# Patient Record
Sex: FEMALE | Race: WHITE | Employment: FULL TIME | URBAN - METROPOLITAN AREA
[De-identification: names, ages, dates, MRNs, and addresses within clinical notes are randomized per-mention and may not be internally consistent; named-entity substitution may affect disease eponyms.]

---

## 2017-01-06 ENCOUNTER — TELEPHONE (OUTPATIENT)
Dept: NEUROLOGY | Age: 52
End: 2017-01-06

## 2017-01-06 NOTE — TELEPHONE ENCOUNTER
Notified the patient that we do have medication in the office.     Can you please note if everything is still approved for her appointment 1/11/2017

## 2017-01-06 NOTE — TELEPHONE ENCOUNTER
Please call re patient asks if her botox has been received? she says she used to get [facility] w/the botox so that she doesn't have the bad two day headache after the injection. She says she was unable to get it the last time, she wants to know if it can be used this time? Please advise.

## 2017-01-09 NOTE — TELEPHONE ENCOUNTER
This order was processed prior to transition to me. There are no patient notes in [place] [address] regarding the Botox.

## 2017-01-11 ENCOUNTER — OFFICE VISIT (OUTPATIENT)
Dept: NEUROLOGY | Age: 52
End: 2017-01-11

## 2017-01-11 ENCOUNTER — TELEPHONE (OUTPATIENT)
Dept: NEUROLOGY | Age: 52
End: 2017-01-11

## 2017-01-11 VITALS
HEART RATE: 70 BPM | SYSTOLIC BLOOD PRESSURE: 104 MMHG | WEIGHT: 134.6 LBS | BODY MASS INDEX: 21.63 KG/M2 | DIASTOLIC BLOOD PRESSURE: 68 MMHG | OXYGEN SATURATION: 94 % | HEIGHT: 66 IN

## 2017-01-11 DIAGNOSIS — G43.719 INTRACTABLE CHRONIC MIGRAINE WITHOUT AURA AND WITHOUT STATUS MIGRAINOSUS: ICD-10-CM

## 2017-01-11 DIAGNOSIS — G93.32 CFS (CHRONIC FATIGUE SYNDROME): Primary | ICD-10-CM

## 2017-01-11 RX ORDER — BUPIVACAINE HYDROCHLORIDE 5 MG/ML
10 INJECTION, SOLUTION PERINEURAL ONCE
Qty: 2 ML | Refills: 0
Start: 2017-01-11 | End: 2017-01-11
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
## 2017-01-11 NOTE — PROCEDURES
Patient comes for Chemo denervation for chronic migraine headaches. Since last seen there has been an at least 60% reduction in the severity of migraines over her previous baseline which exceeded more than 14 migraine days a month. There has been no weakness, fatigue or pain at the site of the injections or generally. After consenting the patient and discussing the procedure and possible side effects. The chemodenervant was reconstituted as follows:  500 units of dysport was mixed with 5ml of perservative free saline and withdrawn, using a sterile syringe into four 1ml BD syringes. Sterile 30 gauge half inch needles were affixed to the syringes. Procedure   Chemodenervant was injected into the following muscles which were identified using their anatomical land marks. The site was aspirated prior to injection to ensure that there was no vascular compromise. Muscle Units No. Of injections   Trapezius BL 15 each 3 each   Cervical Paraspinals BL 15 each 3 each   Occipitalis 10 each 3 each   Temporalis BL 15 each 3 each    BL 5 each 1 each   Procerus 5 1   Frontalis 50 5   Masseter BL 20 each 2 each   Total unitis 195        Nominal bleeding at injection sites. Patient tolerated the procedure well. No reported pain following the procedure.

## 2017-01-11 NOTE — MR AVS SNAPSHOT
Visit Information Date & Time Provider Department Dept. Phone Encounter #  
 1/11/2017  9:40 AM Vance Christiansen MD [facility] at [facility] [phone] [id number] Follow-up Instructions Return in about 3 months (around 4/11/2017). Upcoming Health Maintenance Date Due  
 BREAST CANCER SCRN MAMMOGRAM 2/25/2016 INFLUENZA AGE 9 TO ADULT 8/1/2016 FOBT Q 1 YEAR AGE 50-75 3/13/2017 Pneumococcal 19-64 Highest Risk (2 of 3 - PCV13) 7/15/2017 DTaP/Tdap/Td series (2 - Td) 1/1/2020 Allergies as of 1/11/2017  Review Complete On: 1/11/2017 By: Roque Dupree Severity Noted Reaction Type Reactions Banana  04/26/2016    Other (comments) Trigger Migraine Broccoli  03/09/2016    Diarrhea Corn  03/09/2016    Diarrhea Gluten  03/09/2016    Diarrhea Milk Containing Products  03/09/2016    Diarrhea Morphine  02/11/2015    Hives Pineapple  03/12/2016    Unknown (comments) Pt states unknown reaction Soy  03/09/2016    Diarrhea Toradol [Ketorolac Tromethamine]  02/11/2015    Hives Tramadol  02/11/2015    Hives Watermelon  03/12/2016    Unknown (comments) Pt states unknown reaction Current Immunizations  Reviewed on 12/30/2015 Name Date Tdap 1/1/2010 Not reviewed this visit You Were Diagnosed With   
  
 Codes Comments Intractable chronic migraine without aura and without status migrainosus    -  Primary ICD-10-CM: V65.579 ICD-9-CM: 346.71   
 CFS (chronic fatigue syndrome)     ICD-10-CM: R53.82 
ICD-9-CM: 780.71 Vitals BP Pulse Height(growth percentile) Weight(growth percentile) SpO2 BMI  
 104/68 70 5' 6\" (1.676 m) 134 lb 9.6 oz (61.1 kg) 94% 21.73 kg/m2 OB Status Smoking Status Hysterectomy Former Smoker BMI and BSA Data Body Mass Index Body Surface Area 21.73 kg/m 2 1.69 m 2 Preferred Pharmacy Pharmacy Name Phone [id number] [facility] [phone] Your Updated Medication List  
  
   
This list is accurate as of: 1/11/17 10:23 AM.  Always use your most recent med list.  
  
  
  
  
 ACETYL L-CARNITINE PO Take 500 mg by mouth two (2) times a day. Acetyl l-carnitine-Vitacost brand  
  
 ascorbic acid (vitamin C) 1,000 mg tablet Commonly known as:  VITAMIN C Take 1,000 mg by mouth daily. ASTELIN 137 mcg (0.1 %) nasal spray Generic drug:  azelastine 1 Spray as needed. Use in each nostril as directed  Indications: Indication per Pt:  Migrain HA  
  
 black cohosh 540 mg Cap Take 540 mg by mouth two (2) times a day. Black Cohosh Nature's Way brand BOTOX INJECTION  
by IntraMUSCular route. Every three months  
  
 butalbital-acetaminophen  mg tablet Commonly known as:  Tejas Patel Take 1 Tab by mouth every eight (8) hours. BYSTOLIC 5 mg tablet Generic drug:  nebivolol Take  by mouth daily. Carboxymethylcell-Hypromellose 0.25-0.3 % Dlgl Commonly known as:  GENTEAL GEL Administer 1 Drop to both eyes as needed (ocular dryness). Indications: DRY EYE  
  
 clonazePAM 1 mg tablet Commonly known as:  KlonoPIN  
  
 coenzyme q10-vitamin e 100-100 mg-unit Cap Take 1 Cap by mouth daily. cyanocobalamin 1,000 mcg tablet Take 1,000 mcg by mouth daily. DHEA PO Take 5 mg by mouth daily. l-methylfolate 15 mg tablet Commonly known as:  Leory Heimlich Take 15 mg by mouth daily. L. acidoph & paracasei- S therm- Bifido 8 billion cell Cap cap Commonly known as:  BEAR-Q/RISAQUAD Take 1 Cap by mouth daily. Indications: SUPPLEMENT  
  
 lamoTRIgine 100 mg tablet Commonly known as: LaMICtal  
Take 1 Tab by mouth three (3) times daily. LOTEMAX 0.5 % Drpg Generic drug:  loteprednol etabonate Apply  to eye as needed (allergies). Indications: ALLERGIC CONJUNCTIVITIS MAGNESIUM PO Take 500 mg by mouth three (3) times daily. Magnesium LifeExtension brand  
  
 methylPREDNISolone 4 mg Tab Commonly known as:  MEDROL Take 4 mg by mouth as needed (as needed for migrraine). MULTIVITAMIN PO Take  by mouth. Megafood women over 40 mg one daily NALTREXONE Take 6 mg by mouth daily. Omega-3-DHA-EPA-Fish Oil 1,000 mg (120 mg-180 mg) Cap Take  by mouth. * OTHER  
P5P 50 activated B6-Vitacost brand 50 mg daily * OTHER  
D3-Healthy origins 10,000 international Units daily * OTHER Gum Rincinol P.R.N.  
  
 OTHER(NON-FORMULARY) Take 5 mg by mouth nightly. pavan Deng  
  
 progesterone 100 mg capsule Commonly known as:  PROMETRIUM Take 100 mg by mouth daily. Indications: HORMONE SUPPLEMENT  
  
 promethazine 25 mg tablet Commonly known as:  PHENERGAN Take 25 mg by mouth as needed for Nausea. As needed for migraine  
  
 saliva substitution Mwsh mouthwash Commonly known as:  BIOTENE DRY MOUTH ORAL RINSE 15 mL by Mucous Membrane route as needed (oral rinse for dry mouth). Indications: DRY THROAT  
  
 sodium chloride 0.65 % nasal spray Commonly known as:  OCEAN  
2 Sprays by Both Nostrils route every two (2) hours as needed for Congestion. Indications: DRY NOSE TURMERIC (BULK) Take 1,740 mg by mouth. Turmeric-curcumin C3 complex- vitacost drand TYLENOL 325 mg tablet Generic drug:  acetaminophen Take  by mouth every four (4) hours as needed for Pain. VITAMIN B-2 PO Take  by mouth. 200 mg 2 times daily Xylitol-Antiseptic Combo No.5 Lozx 2-4 Lozenges by Mucous Membrane route as needed (dry mouth). Indications: ORAL HYPOESTHESIA  
  
 zaleplon 10 mg capsule Commonly known as:  SONATA Take 10 mg by mouth nightly. * Notice: This list has 3 medication(s) that are the same as other medications prescribed for you.  Read the directions carefully, and ask your doctor or other care provider to review them with you. Follow-up Instructions Return in about 3 months (around 4/11/2017). Introducing [facility]! Dear Sim Ortiz: 
Thank you for requesting a [employer] account. Our records indicate that you already have an active [employer] account. You can access your account anytime at [email or web address] Did you know that you can access your hospital and ER discharge instructions at any time in [employer]? You can also review all of your test results from your hospital stay or ER visit. Additional Information If you have questions, please visit the Frequently Asked Questions section of the [employer] website at [email or web address] Remember, [employer] is NOT to be used for urgent needs. For medical emergencies, dial 911. Now available from your iPhone and Android! Please provide this summary of care documentation to your next provider. Your primary care clinician is listed as [address] [place] [address]. If you have any questions after today's visit, please call [phone].

## 2017-01-11 NOTE — TELEPHONE ENCOUNTER
Gave patient information to call per [facility] for her Botox in March 2017. [address] [facility] at [phone].

## 2017-03-22 DIAGNOSIS — G43.719 INTRACTABLE CHRONIC MIGRAINE WITHOUT AURA AND WITHOUT STATUS MIGRAINOSUS: ICD-10-CM

## 2017-03-22 NOTE — TELEPHONE ENCOUNTER
Called to verify the medication the patient is concerned about    Future Appointments  Date Time Provider Leeann Anderson   4/11/2017 10:40 AM Clovis Stephenson MD [address]                       Requested Prescriptions     Pending Prescriptions Disp Refills    butalbital-acetaminophen (PHRENILIN)  mg tablet 90 Tab 2     Sig: Take 1 Tab by mouth every eight (8) hours.      Please send in refill

## 2017-03-22 NOTE — TELEPHONE ENCOUNTER
----- Message from Rigoberto Mike sent at 3/22/2017  2:30 PM EDT -----  Regarding: Dr Meg Hernandez: [phone]  Pt is calling due to pharmacy [facility] [address] [facility]  hasn't heard anything from the office yet about refill and Pt is now out of medication and would like a call back .

## 2017-03-23 NOTE — TELEPHONE ENCOUNTER
Patient calling back re can her medication please be filled today? She is in pain and really needs the medicine today if at all possible, she says.

## 2017-03-24 NOTE — TELEPHONE ENCOUNTER
[facility] pharmacy called pt is out of the butalbital she is requesting her refills pharmacy will also be faxing the request

## 2017-03-25 RX ORDER — BUTALBITAL AND ACETAMINOPHEN 325; 50 MG/1; MG/1
1 TABLET ORAL EVERY 8 HOURS
Qty: 90 TAB | Refills: 2 | Status: SHIPPED | OUTPATIENT
Start: 2017-03-25 | End: 2017-06-30 | Stop reason: SDUPTHER

## 2017-03-28 ENCOUNTER — DOCUMENTATION ONLY (OUTPATIENT)
Dept: NEUROLOGY | Age: 52
End: 2017-03-28

## 2017-03-28 NOTE — PROGRESS NOTES
Received botox from [facility]   Lot # [id number]  Exp.  October 2019

## 2017-04-11 ENCOUNTER — OFFICE VISIT (OUTPATIENT)
Dept: NEUROLOGY | Age: 52
End: 2017-04-11

## 2017-04-11 VITALS
HEART RATE: 74 BPM | SYSTOLIC BLOOD PRESSURE: 110 MMHG | BODY MASS INDEX: 21.63 KG/M2 | WEIGHT: 134 LBS | OXYGEN SATURATION: 98 % | DIASTOLIC BLOOD PRESSURE: 62 MMHG

## 2017-04-11 DIAGNOSIS — G43.719 INTRACTABLE CHRONIC MIGRAINE WITHOUT AURA AND WITHOUT STATUS MIGRAINOSUS: Primary | ICD-10-CM

## 2017-04-11 NOTE — MR AVS SNAPSHOT
Visit Information Date & Time Provider Department Dept. Phone Encounter #  
 4/11/2017 10:40 AM Dawn Zepeda MD [facility] [phone] [id number] Follow-up Instructions Return in about 3 months (around 7/11/2017). Upcoming Health Maintenance Date Due  
 BREAST CANCER SCRN MAMMOGRAM 2/25/2016 INFLUENZA AGE 9 TO ADULT 8/1/2016 FOBT Q 1 YEAR AGE 50-75 3/13/2017 DTaP/Tdap/Td series (2 - Td) 1/1/2020 Allergies as of 4/11/2017  Review Complete On: 4/11/2017 By: Indiana Briggs Severity Noted Reaction Type Reactions Banana  04/26/2016    Other (comments) Trigger Migraine Broccoli  03/09/2016    Diarrhea Corn  03/09/2016    Diarrhea Gluten  03/09/2016    Diarrhea Milk Containing Products  03/09/2016    Diarrhea Morphine  02/11/2015    Hives Pineapple  03/12/2016    Unknown (comments) Pt states unknown reaction Soy  03/09/2016    Diarrhea Toradol [Ketorolac Tromethamine]  02/11/2015    Hives Tramadol  02/11/2015    Hives Watermelon  03/12/2016    Unknown (comments) Pt states unknown reaction Current Immunizations  Reviewed on 12/30/2015 Name Date Tdap 1/1/2010 Not reviewed this visit You Were Diagnosed With   
  
 Codes Comments Intractable chronic migraine without aura and without status migrainosus    -  Primary ICD-10-CM: J46.539 ICD-9-CM: 346.71 Vitals BP Pulse Weight(growth percentile) SpO2 BMI OB Status 110/62 74 134 lb (60.8 kg) 98% 21.63 kg/m2 Hysterectomy Smoking Status Former Smoker BMI and BSA Data Body Mass Index Body Surface Area  
 21.63 kg/m 2 1.68 m 2 Preferred Pharmacy Pharmacy Name Phone [phone] [phone] Your Updated Medication List  
  
   
 This list is accurate as of: 4/11/17 12:03 PM.  Always use your most recent med list.  
  
  
  
  
 ACETYL L-CARNITINE PO Take 500 mg by mouth two (2) times a day. Acetyl l-carnitine-Vitacost brand  
  
 ascorbic acid (vitamin C) 1,000 mg tablet Commonly known as:  VITAMIN C Take 1,000 mg by mouth daily. ASTELIN 137 mcg (0.1 %) nasal spray Generic drug:  azelastine 1 Spray as needed. Use in each nostril as directed  Indications: Indication per Pt:  Migrain HA  
  
 black cohosh 540 mg Cap Take 540 mg by mouth two (2) times a day. Black Cohosh [address] brand BOTOX INJECTION  
by IntraMUSCular route. Every three months  
  
 butalbital-acetaminophen  mg tablet Commonly known as:  Jose Mantis Take 1 Tab by mouth every eight (8) hours. BYSTOLIC 5 mg tablet Generic drug:  nebivolol Take  by mouth daily. Carboxymethylcell-Hypromellose 0.25-0.3 % Dlgl Commonly known as:  GENTEAL GEL Administer 1 Drop to both eyes as needed (ocular dryness). Indications: DRY EYE  
  
 clonazePAM 1 mg tablet Commonly known as:  KlonoPIN  
  
 coenzyme q10-vitamin e 100-100 mg-unit Cap Take 1 Cap by mouth daily. cyanocobalamin 1,000 mcg tablet Take 1,000 mcg by mouth daily. DHEA PO Take 5 mg by mouth daily. l-methylfolate 15 mg tablet Commonly known as:  Mammie Heckle Take 15 mg by mouth daily. L. acidoph & paracasei- S therm- Bifido 8 billion cell Cap cap Commonly known as:  BEAR-Q/RISAQUAD Take 1 Cap by mouth daily. Indications: SUPPLEMENT  
  
 lamoTRIgine 100 mg tablet Commonly known as: LaMICtal  
Take 1 Tab by mouth three (3) times daily. LOTEMAX 0.5 % Drpg Generic drug:  loteprednol etabonate Apply  to eye as needed (allergies). Indications: ALLERGIC CONJUNCTIVITIS  
  
 MAGNESIUM PO Take 500 mg by mouth three (3) times daily. Magnesium [employer] brand  
  
 methylPREDNISolone 4 mg Tab Commonly known as:  MEDROL Take 4 mg by mouth as needed (as needed for migrraine). MULTIVITAMIN PO Take  by mouth. Megafood women over 40 mg one daily NALTREXONE Take 6 mg by mouth daily. Omega-3-DHA-EPA-Fish Oil 1,000 mg (120 mg-180 mg) Cap Take  by mouth. * OTHER  
P5P 50 activated B6-Vitacost brand 50 mg daily * OTHER  
D3-Healthy origins 10,000 international Units daily * OTHER Gum Rincinol P.R.N.  
  
 OTHER(NON-FORMULARY) Take 5 mg by mouth nightly. pavan Christy  
  
 progesterone 100 mg capsule Commonly known as:  PROMETRIUM Take 100 mg by mouth daily. Indications: HORMONE SUPPLEMENT  
  
 promethazine 25 mg tablet Commonly known as:  PHENERGAN Take 25 mg by mouth as needed for Nausea. As needed for migraine  
  
 saliva substitution Mwsh mouthwash Commonly known as:  BIOTENE DRY MOUTH ORAL RINSE 15 mL by Mucous Membrane route as needed (oral rinse for dry mouth). Indications: DRY THROAT  
  
 sodium chloride 0.65 % nasal spray Commonly known as:  OCEAN  
2 Sprays by Both Nostrils route every two (2) hours as needed for Congestion. Indications: DRY NOSE TURMERIC (BULK) Take 1,740 mg by mouth. Turmeric-curcumin C3 complex- vitacost alexandre TYLENOL 325 mg tablet Generic drug:  acetaminophen Take  by mouth every four (4) hours as needed for Pain. VITAMIN B-2 PO Take  by mouth. 200 mg 2 times daily Xylitol-Antiseptic Combo No.5 Lozx 2-4 Lozenges by Mucous Membrane route as needed (dry mouth). Indications: ORAL HYPOESTHESIA  
  
 zaleplon 10 mg capsule Commonly known as:  SONATA Take 10 mg by mouth nightly. * Notice: This list has 3 medication(s) that are the same as other medications prescribed for you. Read the directions carefully, and ask your doctor or other care provider to review them with you. Follow-up Instructions Return in about 3 months (around 7/11/2017). Patient Instructions OnabotulinumtoxinA (By injection) OnabotulinumtoxinA (bz-j-moo-bf-UTL-tdd-tox-in-ay) Treats muscle stiffness, muscle spasms, excessive sweating, overactive bladder, or loss of bladder control. Prevents chronic migraine headaches. Improves the appearance of wrinkles on the face. Brand Name(s):Botox, Botox Cosmetic There may be other brand names for this medicine. When This Medicine Should Not Be Used: This medicine is not right for everyone. You should not receive this medicine if you had an allergic reaction to onabotulinumtoxinA or any other botulinum toxin product. How to Use This Medicine:  
Injectable · Your doctor will prescribe your exact dose and tell you how often it should be given. This medicine is given by a healthcare provider as a shot under your skin or into a muscle. · You may be given medicine to numb the area where the shot will be injected. If you receive the medicine around your eyes, you may be given eye drops or ointment to numb the area. After your injection, you may need to wear a protective contact lens or eye patch. · If you are being treated for excessive sweating, shave your underarms but do not use deodorant for 24 hours before your injection. Avoid exercise, hot foods or liquids, or anything else that could make you sweat for 30 minutes before your injection. · The recommended treatment schedule for chronic migraine is every 12 weeks. · This medicine works slowly. Once your condition has improved, the medicine will last about 3 months, then the effects will slowly go away. You might need more injections to treat your condition. ¨ Muscle spasms in the eyelids should improve within 3 to 10 days. ¨ Eye muscle problems should improve 1 or 2 days after the injection, and the improvement should last for 2 to 6 weeks. ¨ Neck pain should improve within 2 to 6 weeks. ¨ Arm stiffness should improve within 4 to 6 weeks. ¨ Facial lines or wrinkles should improve 1 or 2 days. · This medicine should come with a Medication Guide. Ask your pharmacist for a copy if you do not have one. · Missed dose:Call your doctor or pharmacist for instructions. Drugs and Foods to Avoid: Ask your doctor or pharmacist before using any other medicine, including over-the-counter medicines, vitamins, and herbal products. · Some foods and medicine can affect how onabotulinumtoxinA works. Tell your doctor if you are using any of the following: ¨ Aspirin or a blood thinner (such as ticlopidine, warfarin) ¨ Muscle relaxer ¨ Medicine for an infection (such as amikacin, gentamicin, streptomycin, tobramycin) · Tell your doctor if you have received an injection of any botulinum toxin product within the past 4 months. Warnings While Using This Medicine: · Tell your doctor if you are pregnant or breastfeeding, or if you have breathing or lung problems, bleeding problems, heart or blood vessel disease, or nerve or muscle problems (such as myasthenia gravis). Tell your doctor if you have ever had face surgery or if you have a urinary tract infection or trouble urinating, diabetes, or multiple sclerosis. · This medicine may cause the following problems: ¨ Muscle weakness, loss of bladder control, trouble swallowing, speaking, or breathing (caused by the toxin spreading to other parts of your body) · This medicine may make your muscles weak or cause vision problems. Do not drive or do anything else that could be dangerous until you know how this medicine affects you. · There are some warnings that only apply if you are receiving this medicine to treat the following: ¨ Injections near the eye: This medicine may reduce blinking, which can raise the risk of eye problems such as corneal exposure and ulcers. Tell your doctor right away if you notice that you are blinking less than usual or your eyes feel dry. ¨ Urinary incontinence:  This medicine may cause autonomic dysreflexia, which can be a life-threatening condition. ¨ Overactive bladder: Check with your doctor right away if you have trouble urinating or a burning sensation while urinating. · This medicine contains products from donated human blood, so it may contain viruses, although the risk is low. Human donors and blood are always tested for viruses to keep the risk low. Talk with your doctor about this risk if you are concerned. · Your doctor will check your progress and the effects of this medicine at regular visits. Keep all appointments. Possible Side Effects While Using This Medicine:  
Call your doctor right away if you notice any of these side effects: · Allergic reaction: Itching or hives, swelling in your face or hands, swelling or tingling in your mouth or throat, chest tightness, trouble breathing · Blurred or double vision, droopy eyelids · Change in how much or how often you urinate, trouble urinating, or painful urination · Chest pain, slow or uneven heartbeat · Headache, increased sweating, warmth or redness in your face, neck, or arm · Muscle weakness · Trouble swallowing, talking, or breathing If you notice these less serious side effects, talk with your doctor: · Fever, chills, cough, stuffy or runny nose, sore throat, and body aches · Pain in your neck, back, arms, or legs · Redness, pain, tenderness, bruising, swelling, or weakness where the shot was given If you notice other side effects that you think are caused by this medicine, tell your doctor. Call your doctor for medical advice about side effects. You may report side effects to FDA at 4-091-SZD-1062 © 2016 [address] is for End User's use only and may not be sold, redistributed or otherwise used for commercial purposes. The above information is an  only. It is not intended as medical advice for individual conditions or treatments.  Talk to your doctor, nurse or pharmacist before following any medical regimen to see if it is safe and effective for you. Introducing [facility]! Dear Jose Angel Nolan: 
Thank you for requesting a [employer] account. Our records indicate that you already have an active [employer] account. You can access your account anytime at [email or web address] Did you know that you can access your hospital and ER discharge instructions at any time in [employer]? You can also review all of your test results from your hospital stay or ER visit. Additional Information If you have questions, please visit the Frequently Asked Questions section of the [employer] website at [email or web address] Remember, [employer] is NOT to be used for urgent needs. For medical emergencies, dial 911. Now available from your iPhone and Android! Please provide this summary of care documentation to your next provider. Your primary care clinician is listed as Maricruz Ko. If you have any questions after today's visit, please call [phone].

## 2017-04-11 NOTE — PATIENT INSTRUCTIONS
OnabotulinumtoxinA (By injection)   OnabotulinumtoxinA (cg-a-mdv-ui-GYP-rvs-tox-in-ay)  Treats muscle stiffness, muscle spasms, excessive sweating, overactive bladder, or loss of bladder control. Prevents chronic migraine headaches. Improves the appearance of wrinkles on the face. Brand Name(s):Botox, Botox Cosmetic   There may be other brand names for this medicine. When This Medicine Should Not Be Used: This medicine is not right for everyone. You should not receive this medicine if you had an allergic reaction to onabotulinumtoxinA or any other botulinum toxin product. How to Use This Medicine:   Injectable  · Your doctor will prescribe your exact dose and tell you how often it should be given. This medicine is given by a healthcare provider as a shot under your skin or into a muscle. · You may be given medicine to numb the area where the shot will be injected. If you receive the medicine around your eyes, you may be given eye drops or ointment to numb the area. After your injection, you may need to wear a protective contact lens or eye patch. · If you are being treated for excessive sweating, shave your underarms but do not use deodorant for 24 hours before your injection. Avoid exercise, hot foods or liquids, or anything else that could make you sweat for 30 minutes before your injection. · The recommended treatment schedule for chronic migraine is every 12 weeks. · This medicine works slowly. Once your condition has improved, the medicine will last about 3 months, then the effects will slowly go away. You might need more injections to treat your condition. ¨ Muscle spasms in the eyelids should improve within 3 to 10 days. ¨ Eye muscle problems should improve 1 or 2 days after the injection, and the improvement should last for 2 to 6 weeks. ¨ Neck pain should improve within 2 to 6 weeks. ¨ Arm stiffness should improve within 4 to 6 weeks.   ¨ Facial lines or wrinkles should improve 1 or 2 days.  · This medicine should come with a Medication Guide. Ask your pharmacist for a copy if you do not have one. · Missed dose:Call your doctor or pharmacist for instructions. Drugs and Foods to Avoid:   Ask your doctor or pharmacist before using any other medicine, including over-the-counter medicines, vitamins, and herbal products. · Some foods and medicine can affect how onabotulinumtoxinA works. Tell your doctor if you are using any of the following:  ¨ Aspirin or a blood thinner (such as ticlopidine, warfarin)  ¨ Muscle relaxer  ¨ Medicine for an infection (such as amikacin, gentamicin, streptomycin, tobramycin)  · Tell your doctor if you have received an injection of any botulinum toxin product within the past 4 months. Warnings While Using This Medicine:   · Tell your doctor if you are pregnant or breastfeeding, or if you have breathing or lung problems, bleeding problems, heart or blood vessel disease, or nerve or muscle problems (such as myasthenia gravis). Tell your doctor if you have ever had face surgery or if you have a urinary tract infection or trouble urinating, diabetes, or multiple sclerosis. · This medicine may cause the following problems:  ¨ Muscle weakness, loss of bladder control, trouble swallowing, speaking, or breathing (caused by the toxin spreading to other parts of your body)  · This medicine may make your muscles weak or cause vision problems. Do not drive or do anything else that could be dangerous until you know how this medicine affects you. · There are some warnings that only apply if you are receiving this medicine to treat the following:   ¨ Injections near the eye: This medicine may reduce blinking, which can raise the risk of eye problems such as corneal exposure and ulcers. Tell your doctor right away if you notice that you are blinking less than usual or your eyes feel dry. ¨ Urinary incontinence:  This medicine may cause autonomic dysreflexia, which can be a life-threatening condition. ¨ Overactive bladder: Check with your doctor right away if you have trouble urinating or a burning sensation while urinating. · This medicine contains products from donated human blood, so it may contain viruses, although the risk is low. Human donors and blood are always tested for viruses to keep the risk low. Talk with your doctor about this risk if you are concerned. · Your doctor will check your progress and the effects of this medicine at regular visits. Keep all appointments. Possible Side Effects While Using This Medicine:   Call your doctor right away if you notice any of these side effects:  · Allergic reaction: Itching or hives, swelling in your face or hands, swelling or tingling in your mouth or throat, chest tightness, trouble breathing  · Blurred or double vision, droopy eyelids  · Change in how much or how often you urinate, trouble urinating, or painful urination  · Chest pain, slow or uneven heartbeat  · Headache, increased sweating, warmth or redness in your face, neck, or arm  · Muscle weakness  · Trouble swallowing, talking, or breathing  If you notice these less serious side effects, talk with your doctor:   · Fever, chills, cough, stuffy or runny nose, sore throat, and body aches  · Pain in your neck, back, arms, or legs  · Redness, pain, tenderness, bruising, swelling, or weakness where the shot was given  If you notice other side effects that you think are caused by this medicine, tell your doctor. Call your doctor for medical advice about side effects. You may report side effects to FDA at 5-195-FDA-9605  © 2016 [address] is for End User's use only and may not be sold, redistributed or otherwise used for commercial purposes. The above information is an  only. It is not intended as medical advice for individual conditions or treatments.  Talk to your doctor, nurse or pharmacist before following any medical regimen to see if it is safe and effective for you.

## 2017-04-11 NOTE — PROCEDURES
Patient comes for Chemodenervation for chronic migraine headaches. Since last seen there has been an at least 60% reduction in the severity of migraines over her previous baseline which exceeded more than 15 migraine days a month. There has been no weakness, fatigue or pain at the site of the injections or generally. Prior to chemodenervation migraines were refractory to treatment with abortive medications including Imitrex, NSAIDs and barbiturates. Similiarly they were refractory to antidepressants, antiepileptics and betablockers which were used as preventive therapy. Each treatment was tried at least two months or stopped because of an adverse reaction. After consenting the patient and discussing the procedure and possible side effects. The chemodenervant was reconstituted as follows:  200 units of botox was mixed with 4ml of perservative free saline and withdrawn, using a into four sterile 1ml BD syringes. Sterile 30 gauge half inch needles were affixed to the syringes. Procedure   Chemodenervant was injected into the following muscles which were identified using their anatomical land marks. Each site was aspirated prior to injection to ensure that there was no vascular compromise. Muscle Units/inj No. Of injections   Trapezius BL 5 U 3 each   Cervical Paraspinals BL 5 U 3 each   Occipitalis 5 U 3 each   Temporalis BL 5 U 3 each    BL 5 U 1 each   Procerus 5 U 1   Frontalis BL 5 U 5   Masseter BL 5 U  2 each   Total units 180      Nominal bleeding at injection sites. Patient tolerated the procedure well. No reported pain following the procedure. Kimi Shepherd

## 2017-06-23 ENCOUNTER — TELEPHONE (OUTPATIENT)
Dept: NEUROLOGY | Age: 52
End: 2017-06-23

## 2017-06-23 ENCOUNTER — DOCUMENTATION ONLY (OUTPATIENT)
Dept: NEUROLOGY | Age: 52
End: 2017-06-23

## 2017-06-23 DIAGNOSIS — G43.711 CHRONIC MIGRAINE WITHOUT AURA, INTRACTABLE, WITH STATUS MIGRAINOSUS: Primary | ICD-10-CM

## 2017-06-23 NOTE — PROGRESS NOTES
Dr. Rochelle Mcclelland we need a current botox referral for this patient's 07/13th botox appointment     Thank you

## 2017-06-23 NOTE — TELEPHONE ENCOUNTER
Faxed manually PA request for Botox to [facility] for both [id number] and [id number]. They authorized both at the same time, and they use their own [address] - once approved. Last procedure note attached from 4/11/17 - demonstrating 60% reduction of headaches since prior to starting Botox treatments.

## 2017-06-30 DIAGNOSIS — G43.719 INTRACTABLE CHRONIC MIGRAINE WITHOUT AURA AND WITHOUT STATUS MIGRAINOSUS: ICD-10-CM

## 2017-07-07 RX ORDER — BUTALBITAL AND ACETAMINOPHEN 325; 50 MG/1; MG/1
1 TABLET ORAL EVERY 8 HOURS
Qty: 90 TAB | Refills: 2 | Status: SHIPPED | OUTPATIENT
Start: 2017-07-07 | End: 2017-09-26 | Stop reason: SDUPTHER

## 2017-07-07 NOTE — TELEPHONE ENCOUNTER
Dr. Mcnair Doing, received request for refill for butalbital-acetaminophen fom patient do you want to refill?

## 2017-07-10 DIAGNOSIS — G43.719 INTRACTABLE CHRONIC MIGRAINE WITHOUT AURA AND WITHOUT STATUS MIGRAINOSUS: Primary | ICD-10-CM

## 2017-07-10 RX ORDER — METHYLPREDNISOLONE 4 MG/1
TABLET ORAL
Qty: 1 DOSE PACK | Refills: 0 | Status: SHIPPED | OUTPATIENT
Start: 2017-07-10 | End: 2017-09-26 | Stop reason: SDUPTHER

## 2017-07-12 ENCOUNTER — DOCUMENTATION ONLY (OUTPATIENT)
Dept: NEUROLOGY | Age: 52
End: 2017-07-12

## 2017-07-13 ENCOUNTER — OFFICE VISIT (OUTPATIENT)
Dept: NEUROLOGY | Age: 52
End: 2017-07-13

## 2017-07-13 VITALS — DIASTOLIC BLOOD PRESSURE: 60 MMHG | OXYGEN SATURATION: 98 % | SYSTOLIC BLOOD PRESSURE: 100 MMHG | HEART RATE: 66 BPM

## 2017-07-13 DIAGNOSIS — G93.32 CFS (CHRONIC FATIGUE SYNDROME): ICD-10-CM

## 2017-07-13 DIAGNOSIS — G43.719 INTRACTABLE CHRONIC MIGRAINE WITHOUT AURA AND WITHOUT STATUS MIGRAINOSUS: Primary | ICD-10-CM

## 2017-07-13 DIAGNOSIS — M35.01 SJOGREN'S SYNDROME WITH KERATOCONJUNCTIVITIS SICCA (HCC): ICD-10-CM

## 2017-07-13 NOTE — MR AVS SNAPSHOT
Visit Information Date & Time Provider Department Dept. Phone Encounter #  
 7/13/2017 12:00 PM Fany Ascencio MD [facility] at [facility] [phone] [id number] Follow-up Instructions Return in about 3 months (around 10/13/2017) for botox. Upcoming Health Maintenance Date Due  
 BREAST CANCER SCRN MAMMOGRAM 2/25/2016 FOBT Q 1 YEAR AGE 50-75 3/13/2017 INFLUENZA AGE 9 TO ADULT 8/1/2017 DTaP/Tdap/Td series (2 - Td) 1/1/2020 Allergies as of 7/13/2017  Review Complete On: 7/13/2017 By: Fany Ascencio MD  
  
 Severity Noted Reaction Type Reactions Banana  04/26/2016    Other (comments) Trigger Migraine Broccoli  03/09/2016    Diarrhea Corn  03/09/2016    Diarrhea Gluten  03/09/2016    Diarrhea Milk Containing Products  03/09/2016    Diarrhea Morphine  02/11/2015    Hives Pineapple  03/12/2016    Unknown (comments) Pt states unknown reaction Soy  03/09/2016    Diarrhea Toradol [Ketorolac Tromethamine]  02/11/2015    Hives Tramadol  02/11/2015    Hives Watermelon  03/12/2016    Unknown (comments) Pt states unknown reaction Current Immunizations  Reviewed on 12/30/2015 Name Date Tdap 1/1/2010 Not reviewed this visit You Were Diagnosed With   
  
 Codes Comments Intractable chronic migraine without aura and without status migrainosus    -  Primary ICD-10-CM: K38.275 ICD-9-CM: 346.71 Sjogren's syndrome with keratoconjunctivitis sicca (HCC)     ICD-10-CM: M35.01 
ICD-9-CM: 710.2 CFS (chronic fatigue syndrome)     ICD-10-CM: R53.82 
ICD-9-CM: 780.71 Vitals BP Pulse SpO2 OB Status Smoking Status 100/60 66 98% Hysterectomy Former Smoker Preferred Pharmacy Pharmacy Name Phone [phone] [facility] [phone] Your Updated Medication List  
  
   
 This list is accurate as of: 7/13/17 12:42 PM.  Always use your most recent med list.  
  
  
  
  
 ACETYL L-CARNITINE PO Take 500 mg by mouth two (2) times a day. Acetyl l-carnitine-Vitacost brand  
  
 ascorbic acid (vitamin C) 1,000 mg tablet Commonly known as:  VITAMIN C Take 1,000 mg by mouth daily. ASTELIN 137 mcg (0.1 %) nasal spray Generic drug:  azelastine 1 Spray as needed. Use in each nostril as directed  Indications: Indication per Pt:  Migrain HA  
  
 black cohosh 540 mg Cap Take 540 mg by mouth two (2) times a day. Black Cohosh [address] brand  
  
 butalbital-acetaminophen  mg tablet Commonly known as:  Eugenio Peaches Take 1 Tab by mouth every eight (8) hours. BYSTOLIC 5 mg tablet Generic drug:  nebivolol Take  by mouth daily. Carboxymethylcell-Hypromellose 0.25-0.3 % Dlgl Commonly known as:  GENTEAL GEL Administer 1 Drop to both eyes as needed (ocular dryness). Indications: DRY EYE  
  
 clonazePAM 1 mg tablet Commonly known as:  KlonoPIN  
  
 coenzyme q10-vitamin e 100-100 mg-unit Cap Take 1 Cap by mouth daily. cyanocobalamin 1,000 mcg tablet Take 1,000 mcg by mouth daily. DHEA PO Take 5 mg by mouth daily. l-methylfolate 15 mg tablet Commonly known as:  Iglesia Song Take 15 mg by mouth daily. L. acidoph & paracasei- S therm- Bifido 8 billion cell Cap cap Commonly known as:  BEAR-Q/RISAQUAD Take 1 Cap by mouth daily. Indications: SUPPLEMENT  
  
 lamoTRIgine 100 mg tablet Commonly known as: LaMICtal  
Take 1 Tab by mouth three (3) times daily. LOTEMAX 0.5 % Drpg Generic drug:  loteprednol etabonate Apply  to eye as needed (allergies). Indications: ALLERGIC CONJUNCTIVITIS  
  
 MAGNESIUM PO Take 500 mg by mouth three (3) times daily. Magnesium [employer] brand * methylPREDNISolone 4 mg Tab Commonly known as:  MEDROL Take 4 mg by mouth as needed (as needed for migrraine). * methylPREDNISolone 4 mg tablet Commonly known as:  Pollie Parviz Take as directed MULTIVITAMIN PO Take  by mouth. Megafood women over 40 mg one daily NALTREXONE Take 6 mg by mouth daily. Omega-3-DHA-EPA-Fish Oil 1,000 mg (120 mg-180 mg) Cap Take  by mouth. onabotulinumtoxinA 200 unit injection Commonly known as:  BOTOX  
200 units by IM route once every 12 weeks indication migraine prevention Dx G43.719. Inject IM neck/face,31 FDA approved sites * OTHER  
P5P 50 activated B6-Vitacost brand 50 mg daily * OTHER  
D3-Healthy origins 10,000 international Units daily * OTHER Gum Rincinol P.R.N.  
  
 OTHER(NON-FORMULARY) Take 5 mg by mouth nightly. pavan Diaz  
  
 progesterone 100 mg capsule Commonly known as:  PROMETRIUM Take 100 mg by mouth daily. Indications: HORMONE SUPPLEMENT  
  
 promethazine 25 mg tablet Commonly known as:  PHENERGAN Take 25 mg by mouth as needed for Nausea. As needed for migraine  
  
 saliva substitution Mwsh mouthwash Commonly known as:  BIOTENE DRY MOUTH ORAL RINSE 15 mL by Mucous Membrane route as needed (oral rinse for dry mouth). Indications: DRY THROAT  
  
 sodium chloride 0.65 % nasal spray Commonly known as:  OCEAN  
2 Sprays by Both Nostrils route every two (2) hours as needed for Congestion. Indications: DRY NOSE TURMERIC (BULK) Take 1,740 mg by mouth. Turmeric-curcumin C3 complex- vitacost alexandre TYLENOL 325 mg tablet Generic drug:  acetaminophen Take  by mouth every four (4) hours as needed for Pain. VITAMIN B-2 PO Take  by mouth. 200 mg 2 times daily Xylitol-Antiseptic Combo No.5 Lozx 2-4 Lozenges by Mucous Membrane route as needed (dry mouth). Indications: ORAL HYPOESTHESIA  
  
 zaleplon 10 mg capsule Commonly known as:  SONATA Take 10 mg by mouth nightly. * Notice:   This list has 5 medication(s) that are the same as other medications prescribed for you. Read the directions carefully, and ask your doctor or other care provider to review them with you. Follow-up Instructions Return in about 3 months (around 10/13/2017) for botox. Patient Instructions OnabotulinumtoxinA (By injection) OnabotulinumtoxinA (fs-n-yhz-ls-LZL-qut-tox-in-ay) Treats muscle stiffness, muscle spasms, excessive sweating, overactive bladder, or loss of bladder control. Prevents chronic migraine headaches. Improves the appearance of wrinkles on the face. Brand Name(s): Botox, Botox Cosmetic There may be other brand names for this medicine. When This Medicine Should Not Be Used: This medicine is not right for everyone. You should not receive this medicine if you had an allergic reaction to onabotulinumtoxinA or any other botulinum toxin product. How to Use This Medicine:  
Injectable · Your doctor will prescribe your exact dose and tell you how often it should be given. This medicine is given by a healthcare provider as a shot under your skin or into a muscle. · You may be given medicine to numb the area where the shot will be injected. If you receive the medicine around your eyes, you may be given eye drops or ointment to numb the area. After your injection, you may need to wear a protective contact lens or eye patch. · If you are being treated for excessive sweating, shave your underarms but do not use deodorant for 24 hours before your injection. Avoid exercise, hot foods or liquids, or anything else that could make you sweat for 30 minutes before your injection. · The recommended treatment schedule for chronic migraine is every 12 weeks. · This medicine works slowly. Once your condition has improved, the medicine will last about 3 months, then the effects will slowly go away. You might need more injections to treat your condition. ¨ Muscle spasms in the eyelids should improve within 3 to 10 days. ¨ Eye muscle problems should improve 1 or 2 days after the injection, and the improvement should last for 2 to 6 weeks. ¨ Neck pain should improve within 2 to 6 weeks. ¨ Arm stiffness should improve within 4 to 6 weeks. ¨ Facial lines or wrinkles should improve 1 or 2 days. · This medicine should come with a Medication Guide. Ask your pharmacist for a copy if you do not have one. · Missed dose:Call your doctor or pharmacist for instructions. Drugs and Foods to Avoid: Ask your doctor or pharmacist before using any other medicine, including over-the-counter medicines, vitamins, and herbal products. · Some foods and medicine can affect how onabotulinumtoxinA works. Tell your doctor if you are using any of the following: ¨ Aspirin or a blood thinner (such as ticlopidine, warfarin) ¨ Muscle relaxer ¨ Medicine for an infection (such as amikacin, gentamicin, streptomycin, tobramycin) · Tell your doctor if you have received an injection of any botulinum toxin product within the past 4 months. Warnings While Using This Medicine: · Tell your doctor if you are pregnant or breastfeeding, or if you have breathing or lung problems, bleeding problems, heart or blood vessel disease, or nerve or muscle problems (such as myasthenia gravis). Tell your doctor if you have ever had face surgery or if you have a urinary tract infection or trouble urinating, diabetes, or multiple sclerosis. · This medicine may cause the following problems: ¨ Muscle weakness, loss of bladder control, trouble swallowing, speaking, or breathing (caused by the toxin spreading to other parts of your body) · This medicine may make your muscles weak or cause vision problems. Do not drive or do anything else that could be dangerous until you know how this medicine affects you. · There are some warnings that only apply if you are receiving this medicine to treat the following: ¨ Injections near the eye: This medicine may reduce blinking, which can raise the risk of eye problems such as corneal exposure and ulcers. Tell your doctor right away if you notice that you are blinking less than usual or your eyes feel dry. ¨ Urinary incontinence: This medicine may cause autonomic dysreflexia, which can be a life-threatening condition. ¨ Overactive bladder: Check with your doctor right away if you have trouble urinating or a burning sensation while urinating. · This medicine contains products from donated human blood, so it may contain viruses, although the risk is low. Human donors and blood are always tested for viruses to keep the risk low. Talk with your doctor about this risk if you are concerned. · Your doctor will check your progress and the effects of this medicine at regular visits. Keep all appointments. Possible Side Effects While Using This Medicine:  
Call your doctor right away if you notice any of these side effects: · Allergic reaction: Itching or hives, swelling in your face or hands, swelling or tingling in your mouth or throat, chest tightness, trouble breathing · Blurred or double vision, droopy eyelids · Change in how much or how often you urinate, trouble urinating, or painful urination · Chest pain, slow or uneven heartbeat · Headache, increased sweating, warmth or redness in your face, neck, or arm · Muscle weakness · Trouble swallowing, talking, or breathing If you notice these less serious side effects, talk with your doctor: · Fever, chills, cough, stuffy or runny nose, sore throat, and body aches · Pain in your neck, back, arms, or legs · Redness, pain, tenderness, bruising, swelling, or weakness where the shot was given If you notice other side effects that you think are caused by this medicine, tell your doctor. Call your doctor for medical advice about side effects. You may report side effects to FDA at 2-723-GDV-0374 © 2017 [facility] Information is for End User's use only and may not be sold, redistributed or otherwise used for commercial purposes. The above information is an  only. It is not intended as medical advice for individual conditions or treatments. Talk to your doctor, nurse or pharmacist before following any medical regimen to see if it is safe and effective for you. PRESCRIPTION REFILL POLICY [facility] Statement to Patients April 1, 2014 In an effort to ensure the large volume of patient prescription refills is processed in the most efficient and expeditious manner, we are asking our patients to assist us by calling your Pharmacy for all prescription refills, this will include also your  Mail Order Pharmacy. The pharmacy will contact our office electronically to continue the refill process. Please do not wait until the last minute to call your pharmacy. We need at least 48 hours (2days) to fill prescriptions. We also encourage you to call your pharmacy before going to  your prescription to make sure it is ready. With regard to controlled substance prescription refill requests (narcotic refills) that need to be picked up at our office, we ask your cooperation by providing us with at least 72 hours (3days) notice that you will need a refill. We will not refill narcotic prescription refill requests after 4:00pm on any weekday, Monday through Thursday, or after 2:00pm on Fridays, or on the weekends. We encourage everyone to explore another way of getting your prescription refill request processed using [employer], our patient web portal through our electronic medical record system. [employer] is an efficient and effective way to communicate your medication request directly to the office and  downloadable as an dori on your smart phone .  [employer] also features a review functionality that allows you to view your medication list as well as leave messages for your physician. Are you ready to get connected? If so please review the attatched instructions or speak to any of our staff to get you set up right away! Thank you so much for your cooperation. Should you have any questions please contact our Practice Administrator. The Physicians and Staff,  [facility] Introducing hospitals & [facility] SERVICES! Dear Miranda Woodall: 
Thank you for requesting a Epic[employer] account. Our records indicate that you already have an active Epic[employer] account. You can access your account anytime at [email or web address] Did you know that you can access your hospital and ER discharge instructions at any time in EpicTopic? You can also review all of your test results from your hospital stay or ER visit. Additional Information If you have questions, please visit the Frequently Asked Questions section of the EpicTopic website at [email or web address] Remember, EpicTopic is NOT to be used for urgent needs. For medical emergencies, dial 911. Now available from your iPhone and Android! Please provide this summary of care documentation to your next provider. Your primary care clinician is listed as [address]. If you have any questions after today's visit, please call [phone].

## 2017-07-13 NOTE — PATIENT INSTRUCTIONS
OnabotulinumtoxinA (By injection)   OnabotulinumtoxinA (ek-f-goc-ol-SNH-qog-tox-in-ay)  Treats muscle stiffness, muscle spasms, excessive sweating, overactive bladder, or loss of bladder control. Prevents chronic migraine headaches. Improves the appearance of wrinkles on the face. Brand Name(s): Botox, Botox Cosmetic   There may be other brand names for this medicine. When This Medicine Should Not Be Used: This medicine is not right for everyone. You should not receive this medicine if you had an allergic reaction to onabotulinumtoxinA or any other botulinum toxin product. How to Use This Medicine:   Injectable  · Your doctor will prescribe your exact dose and tell you how often it should be given. This medicine is given by a healthcare provider as a shot under your skin or into a muscle. · You may be given medicine to numb the area where the shot will be injected. If you receive the medicine around your eyes, you may be given eye drops or ointment to numb the area. After your injection, you may need to wear a protective contact lens or eye patch. · If you are being treated for excessive sweating, shave your underarms but do not use deodorant for 24 hours before your injection. Avoid exercise, hot foods or liquids, or anything else that could make you sweat for 30 minutes before your injection. · The recommended treatment schedule for chronic migraine is every 12 weeks. · This medicine works slowly. Once your condition has improved, the medicine will last about 3 months, then the effects will slowly go away. You might need more injections to treat your condition. ¨ Muscle spasms in the eyelids should improve within 3 to 10 days. ¨ Eye muscle problems should improve 1 or 2 days after the injection, and the improvement should last for 2 to 6 weeks. ¨ Neck pain should improve within 2 to 6 weeks. ¨ Arm stiffness should improve within 4 to 6 weeks.   ¨ Facial lines or wrinkles should improve 1 or 2 days.  · This medicine should come with a Medication Guide. Ask your pharmacist for a copy if you do not have one. · Missed dose:Call your doctor or pharmacist for instructions. Drugs and Foods to Avoid:   Ask your doctor or pharmacist before using any other medicine, including over-the-counter medicines, vitamins, and herbal products. · Some foods and medicine can affect how onabotulinumtoxinA works. Tell your doctor if you are using any of the following:  ¨ Aspirin or a blood thinner (such as ticlopidine, warfarin)  ¨ Muscle relaxer  ¨ Medicine for an infection (such as amikacin, gentamicin, streptomycin, tobramycin)  · Tell your doctor if you have received an injection of any botulinum toxin product within the past 4 months. Warnings While Using This Medicine:   · Tell your doctor if you are pregnant or breastfeeding, or if you have breathing or lung problems, bleeding problems, heart or blood vessel disease, or nerve or muscle problems (such as myasthenia gravis). Tell your doctor if you have ever had face surgery or if you have a urinary tract infection or trouble urinating, diabetes, or multiple sclerosis. · This medicine may cause the following problems:  ¨ Muscle weakness, loss of bladder control, trouble swallowing, speaking, or breathing (caused by the toxin spreading to other parts of your body)  · This medicine may make your muscles weak or cause vision problems. Do not drive or do anything else that could be dangerous until you know how this medicine affects you. · There are some warnings that only apply if you are receiving this medicine to treat the following:   ¨ Injections near the eye: This medicine may reduce blinking, which can raise the risk of eye problems such as corneal exposure and ulcers. Tell your doctor right away if you notice that you are blinking less than usual or your eyes feel dry. ¨ Urinary incontinence:  This medicine may cause autonomic dysreflexia, which can be a life-threatening condition. ¨ Overactive bladder: Check with your doctor right away if you have trouble urinating or a burning sensation while urinating. · This medicine contains products from donated human blood, so it may contain viruses, although the risk is low. Human donors and blood are always tested for viruses to keep the risk low. Talk with your doctor about this risk if you are concerned. · Your doctor will check your progress and the effects of this medicine at regular visits. Keep all appointments. Possible Side Effects While Using This Medicine:   Call your doctor right away if you notice any of these side effects:  · Allergic reaction: Itching or hives, swelling in your face or hands, swelling or tingling in your mouth or throat, chest tightness, trouble breathing  · Blurred or double vision, droopy eyelids  · Change in how much or how often you urinate, trouble urinating, or painful urination  · Chest pain, slow or uneven heartbeat  · Headache, increased sweating, warmth or redness in your face, neck, or arm  · Muscle weakness  · Trouble swallowing, talking, or breathing  If you notice these less serious side effects, talk with your doctor:   · Fever, chills, cough, stuffy or runny nose, sore throat, and body aches  · Pain in your neck, back, arms, or legs  · Redness, pain, tenderness, bruising, swelling, or weakness where the shot was given  If you notice other side effects that you think are caused by this medicine, tell your doctor. Call your doctor for medical advice about side effects. You may report side effects to FDA at 5-991-FDA-9791  © 2017 [address] Information is for End User's use only and may not be sold, redistributed or otherwise used for commercial purposes. The above information is an  only. It is not intended as medical advice for individual conditions or treatments.  Talk to your doctor, nurse or pharmacist before following any medical regimen to see if it is safe and effective for you. [facility]   Statement to Patients  April 1, 2014      In an effort to ensure the large volume of patient prescription refills is processed in the most efficient and expeditious manner, we are asking our patients to assist us by calling your Pharmacy for all prescription refills, this will include also your  Mail Order Pharmacy. The pharmacy will contact our office electronically to continue the refill process. Please do not wait until the last minute to call your pharmacy. We need at least 48 hours (2days) to fill prescriptions. We also encourage you to call your pharmacy before going to  your prescription to make sure it is ready. With regard to controlled substance prescription refill requests (narcotic refills) that need to be picked up at our office, we ask your cooperation by providing us with at least 72 hours (3days) notice that you will need a refill. We will not refill narcotic prescription refill requests after 4:00pm on any weekday, Monday through Thursday, or after 2:00pm on Fridays, or on the weekends. We encourage everyone to explore another way of getting your prescription refill request processed using [employer], our patient web portal through our electronic medical record system. [employer] is an efficient and effective way to communicate your medication request directly to the office and  downloadable as an dori on your smart phone . [employer] also features a review functionality that allows you to view your medication list as well as leave messages for your physician. Are you ready to get connected? If so please review the attatched instructions or speak to any of our staff to get you set up right away! Thank you so much for your cooperation. Should you have any questions please contact our Practice Administrator.     The Physicians and Staff,  [facility]

## 2017-07-14 NOTE — PROCEDURES
Patient comes for Chemodenervation for chronic migraine headaches. Since last seen there has been an at least 60% reduction in the severity of migraines over her previous baseline which exceeded more than 15 migraine days a month. There has been no weakness, fatigue or pain at the site of the injections or generally. Prior to chemodenervation migraines were refractory to treatment with abortive medications including Imitrex, NSAIDs and barbiturates. Similiarly they were refractory to antidepressants, antiepileptics and betablockers which were used as preventive therapy. Each treatment was tried at least two months or stopped because of an adverse reaction. After consenting the patient and discussing the procedure and possible side effects. The chemodenervant was reconstituted as follows:  200 units of botox was mixed with 4ml of perservative free saline and withdrawn, using a into four sterile 1ml BD syringes. Sterile 30 gauge half inch needles were affixed to the syringes. Procedure   Chemodenervant was injected into the following muscles which were identified using their anatomical land marks. Each site was aspirated prior to injection to ensure that there was no vascular compromise. Muscle Units/inj No. Of injections   Trapezius BL 5 U 3 each   Cervical Paraspinals BL 5 U 3 each   Occipitalis 5 U 3 each   Temporalis BL 5 U 3 each    BL 5 U 1 each   Procerus 5 U 1   Frontalis BL 5 U 5   Masseter BL 5 U  2 each   Total units 180      Nominal bleeding at injection sites. Patient tolerated the procedure well. No reported pain following the procedure. Corey Mead

## 2017-07-17 ENCOUNTER — TELEPHONE (OUTPATIENT)
Dept: NEUROLOGY | Age: 52
End: 2017-07-17

## 2017-07-17 NOTE — TELEPHONE ENCOUNTER
----- Message from Kinsey Stewart sent at 7/17/2017  2:01 PM EDT -----  Regarding: Dr. Thai Adair  Pt has a ride for her  next Botox appointment on 10/13/17. Pt is moving the appointment that was scheduled on 10/20/17 to 10/13/17. Pt needs to confirm what time the appointment is. Pt  is available to come 11 am, or any time on that day. Pt can be reached at [phone].

## 2017-09-24 DIAGNOSIS — G43.719 INTRACTABLE CHRONIC MIGRAINE WITHOUT AURA AND WITHOUT STATUS MIGRAINOSUS: ICD-10-CM

## 2017-09-25 NOTE — TELEPHONE ENCOUNTER
Future Appointments  Date Time Provider Leeann Anderson   10/13/2017 12:00 PM Clementine Cheadle, MD [address]   12/7/2017 2:30 PM MD Debbie Marquez 88                         Last Appointment My Department:  7/13/2017    Please advise of refill below. Last fill of phrenilin 07/07/17 #90 two refills  Last fill of prednisone 07/10/17     Requested Prescriptions     Pending Prescriptions Disp Refills    butalbital-acetaminophen (PHRENILIN)  mg tablet 90 Tab 2     Sig: Take 1 Tab by mouth every eight (8) hours.     methylPREDNISolone (MEDROL DOSEPACK) 4 mg tablet 1 Dose Pack 0     Sig: Take as directed

## 2017-09-25 NOTE — TELEPHONE ENCOUNTER
From: Rosa Torres  To: Fabian Fishman MD  Sent: 9/24/2017 9:51 PM EDT  Subject: Medication Renewal Request    Original authorizing provider: MD Matilde Lo.  Jeffrey Payne would like a refill of the following medications:  butalbital-acetaminophen (PHRENILIN)  mg tablet [Nadya Simmons MD]  methylPREDNISolone (MEDROL DOSEPACK) 4 mg tablet Fabian Fishman MD]    Preferred pharmacy: [facility]/PHARMACY #[id number], [address] [place] [address]:

## 2017-09-26 ENCOUNTER — TELEPHONE (OUTPATIENT)
Dept: NEUROLOGY | Age: 52
End: 2017-09-26

## 2017-09-26 RX ORDER — BUTALBITAL AND ACETAMINOPHEN 325; 50 MG/1; MG/1
1 TABLET ORAL EVERY 8 HOURS
Qty: 90 TAB | Refills: 2 | Status: SHIPPED | COMMUNITY
Start: 2017-09-26 | End: 2017-12-15 | Stop reason: SDUPTHER

## 2017-09-26 RX ORDER — METHYLPREDNISOLONE 4 MG/1
TABLET ORAL
Qty: 1 DOSE PACK | Refills: 0 | Status: SHIPPED | COMMUNITY
Start: 2017-09-26 | End: 2017-12-15 | Stop reason: SDUPTHER

## 2017-09-26 NOTE — TELEPHONE ENCOUNTER
Botox [id number] and CPT 75326 Auth  6/23/17 - 6/23/18.      Called [facility] to set up the botox shipment, spoke with Flakito Champion and she stated that they will call back within 2-3 business days, the information has to run through the insurance  -Advised that the patient has an appointment on 10/13th and the agent stated that she would make sure we have plenty of time before her botox appointment

## 2017-10-03 ENCOUNTER — DOCUMENTATION ONLY (OUTPATIENT)
Dept: NEUROLOGY | Age: 52
End: 2017-10-03

## 2017-10-13 ENCOUNTER — OFFICE VISIT (OUTPATIENT)
Dept: NEUROLOGY | Age: 52
End: 2017-10-13

## 2017-10-13 VITALS
OXYGEN SATURATION: 95 % | DIASTOLIC BLOOD PRESSURE: 62 MMHG | HEIGHT: 66 IN | WEIGHT: 129 LBS | HEART RATE: 64 BPM | BODY MASS INDEX: 20.73 KG/M2 | SYSTOLIC BLOOD PRESSURE: 110 MMHG

## 2017-10-13 DIAGNOSIS — G43.719 INTRACTABLE CHRONIC MIGRAINE WITHOUT AURA AND WITHOUT STATUS MIGRAINOSUS: Primary | ICD-10-CM

## 2017-10-13 NOTE — MR AVS SNAPSHOT
Visit Information Date & Time Provider Department Dept. Phone Encounter #  
 10/13/2017 12:00 PM Shon Rucker MD [facility] [phone] [id number] Follow-up Instructions Return in about 3 months (around 1/13/2018) for MIGRAINE. Your Appointments 12/7/2017  2:30 PM  
PHYSICAL PRE OP with Uriel Sears MD  
[facility] Appt Note: np est pcp  
 [id number]  
[address] [facility] [address] [place] [address] Upcoming Health Maintenance Date Due  
 BREAST CANCER SCRN MAMMOGRAM 2/25/2016 FOBT Q 1 YEAR AGE 50-75 3/13/2017 INFLUENZA AGE 9 TO ADULT 8/1/2017 DTaP/Tdap/Td series (2 - Td) 1/1/2020 Allergies as of 10/13/2017  Review Complete On: 10/13/2017 By: Shon Rucker MD  
  
 Severity Noted Reaction Type Reactions Banana  04/26/2016    Other (comments) Trigger Migraine Broccoli  03/09/2016    Diarrhea Corn  03/09/2016    Diarrhea Gluten  03/09/2016    Diarrhea Milk Containing Products  03/09/2016    Diarrhea Morphine  02/11/2015    Hives Pineapple  03/12/2016    Unknown (comments) Pt states unknown reaction Soy  03/09/2016    Diarrhea Toradol [Ketorolac Tromethamine]  02/11/2015    Hives Tramadol  02/11/2015    Hives Watermelon  03/12/2016    Unknown (comments) Pt states unknown reaction Current Immunizations  Reviewed on 12/30/2015 Name Date Tdap 1/1/2010 Not reviewed this visit Vitals BP Pulse Height(growth percentile) Weight(growth percentile) SpO2 BMI  
 110/62 64 5' 6\" (1.676 m) 129 lb (58.5 kg) 95% 20.82 kg/m2 OB Status Smoking Status Hysterectomy Former Smoker BMI and BSA Data Body Mass Index Body Surface Area  
 20.82 kg/m 2 1.65 m 2 Preferred Pharmacy Pharmacy Name Phone [facility]/PHARMACY #[id number] [facility] [phone] Your Updated Medication List  
  
   
This list is accurate as of: 10/13/17 12:31 PM.  Always use your most recent med list.  
  
  
  
  
 ACETYL L-CARNITINE PO Take 500 mg by mouth two (2) times a day. Acetyl l-carnitine-Vitacost brand  
  
 ascorbic acid (vitamin C) 1,000 mg tablet Commonly known as:  VITAMIN C Take 1,000 mg by mouth daily. ASTELIN 137 mcg (0.1 %) nasal spray Generic drug:  azelastine 1 Spray as needed. Use in each nostril as directed  Indications: Indication per Pt:  Migrain HA  
  
 black cohosh 540 mg Cap Take 540 mg by mouth two (2) times a day. Black Cohosh [address] brand  
  
 butalbital-acetaminophen  mg tablet Commonly known as:  Aishwarya Peter Take 1 Tab by mouth every eight (8) hours. BYSTOLIC 5 mg tablet Generic drug:  nebivolol Take  by mouth daily. Carboxymethylcell-Hypromellose 0.25-0.3 % Dlgl Commonly known as:  GENTEAL GEL Administer 1 Drop to both eyes as needed (ocular dryness). Indications: DRY EYE  
  
 clonazePAM 1 mg tablet Commonly known as:  KlonoPIN  
  
 coenzyme q10-vitamin e 100-100 mg-unit Cap Take 1 Cap by mouth daily. cyanocobalamin 1,000 mcg tablet Take 1,000 mcg by mouth daily. DHEA PO Take 5 mg by mouth daily. l-methylfolate 15 mg tablet Commonly known as:  Mora Furnish Take 15 mg by mouth daily. L. acidoph & paracasei- S therm- Bifido 8 billion cell Cap cap Commonly known as:  BEAR-Q/RISAQUAD Take 1 Cap by mouth daily. Indications: SUPPLEMENT  
  
 lamoTRIgine 100 mg tablet Commonly known as: LaMICtal  
Take 1 Tab by mouth three (3) times daily. LOTEMAX 0.5 % Drpg Generic drug:  loteprednol etabonate Apply  to eye as needed (allergies).  Indications: ALLERGIC CONJUNCTIVITIS  
  
 MAGNESIUM PO  
 Take 500 mg by mouth three (3) times daily. Magnesium [employer] brand * methylPREDNISolone 4 mg Tab Commonly known as:  MEDROL Take 4 mg by mouth as needed (as needed for migrraine). * methylPREDNISolone 4 mg tablet Commonly known as:  Judithe Agueda Take as directed MULTIVITAMIN PO Take  by mouth. Megafood women over 40 mg one daily NALTREXONE Take 6 mg by mouth daily. Omega-3-DHA-EPA-Fish Oil 1,000 mg (120 mg-180 mg) Cap Take  by mouth. onabotulinumtoxinA 200 unit injection Commonly known as:  BOTOX  
200 units by IM route once every 12 weeks indication migraine prevention Dx G43.719. Inject IM neck/face,31 FDA approved sites * OTHER  
P5P 50 activated B6-Vitacost brand 50 mg daily * OTHER  
D3-Healthy origins 10,000 international Units daily * OTHER Gum Rincinol P.R.N.  
  
 OTHER(NON-FORMULARY) Take 5 mg by mouth nightly. pavan Oliva  
  
 progesterone 100 mg capsule Commonly known as:  PROMETRIUM Take 100 mg by mouth daily. Indications: HORMONE SUPPLEMENT  
  
 promethazine 25 mg tablet Commonly known as:  PHENERGAN Take 25 mg by mouth as needed for Nausea. As needed for migraine  
  
 saliva substitution Mwsh mouthwash Commonly known as:  BIOTENE DRY MOUTH ORAL RINSE 15 mL by Mucous Membrane route as needed (oral rinse for dry mouth). Indications: DRY THROAT  
  
 sodium chloride 0.65 % nasal spray Commonly known as:  OCEAN  
2 Sprays by Both Nostrils route every two (2) hours as needed for Congestion. Indications: DRY NOSE TURMERIC (BULK) Take 1,740 mg by mouth. Turmeric-curcumin C3 complex- vitacost alexandre TYLENOL 325 mg tablet Generic drug:  acetaminophen Take  by mouth every four (4) hours as needed for Pain. VITAMIN B-2 PO Take  by mouth. 200 mg 2 times daily Xylitol-Antiseptic Combo No.5 Lozx 2-4 Lozenges by Mucous Membrane route as needed (dry mouth).  Indications: ORAL HYPOESTHESIA  
  
 zaleplon 10 mg capsule Commonly known as:  SONATA Take 10 mg by mouth nightly. * Notice: This list has 5 medication(s) that are the same as other medications prescribed for you. Read the directions carefully, and ask your doctor or other care provider to review them with you. Follow-up Instructions Return in about 3 months (around 1/13/2018) for MIGRAINE. Patient Instructions OnabotulinumtoxinA (By injection) OnabotulinumtoxinA (ts-b-jcm-bd-FLP-ehu-tox-in-ay) Treats muscle stiffness, muscle spasms, excessive sweating, overactive bladder, or loss of bladder control. Prevents chronic migraine headaches. Improves the appearance of wrinkles on the face. Brand Name(s): Botox, Botox Cosmetic There may be other brand names for this medicine. When This Medicine Should Not Be Used: This medicine is not right for everyone. You should not receive this medicine if you had an allergic reaction to onabotulinumtoxinA or any other botulinum toxin product. How to Use This Medicine:  
Injectable · Your doctor will prescribe your exact dose and tell you how often it should be given. This medicine is given by a healthcare provider as a shot under your skin or into a muscle. · You may be given medicine to numb the area where the shot will be injected. If you receive the medicine around your eyes, you may be given eye drops or ointment to numb the area. After your injection, you may need to wear a protective contact lens or eye patch. · If you are being treated for excessive sweating, shave your underarms but do not use deodorant for 24 hours before your injection. Avoid exercise, hot foods or liquids, or anything else that could make you sweat for 30 minutes before your injection. · The recommended treatment schedule for chronic migraine is every 12 weeks. · This medicine works slowly.  Once your condition has improved, the medicine will last about 3 months, then the effects will slowly go away. You might need more injections to treat your condition. ¨ Muscle spasms in the eyelids should improve within 3 to 10 days. ¨ Eye muscle problems should improve 1 or 2 days after the injection, and the improvement should last for 2 to 6 weeks. ¨ Neck pain should improve within 2 to 6 weeks. ¨ Arm stiffness should improve within 4 to 6 weeks. ¨ Facial lines or wrinkles should improve 1 or 2 days. · This medicine should come with a Medication Guide. Ask your pharmacist for a copy if you do not have one. · Missed dose:Call your doctor or pharmacist for instructions. Drugs and Foods to Avoid: Ask your doctor or pharmacist before using any other medicine, including over-the-counter medicines, vitamins, and herbal products. · Some foods and medicine can affect how onabotulinumtoxinA works. Tell your doctor if you are using any of the following: ¨ Aspirin or a blood thinner (such as ticlopidine, warfarin) ¨ Muscle relaxer ¨ Medicine for an infection (such as amikacin, gentamicin, streptomycin, tobramycin) · Tell your doctor if you have received an injection of any botulinum toxin product within the past 4 months. Warnings While Using This Medicine: · Tell your doctor if you are pregnant or breastfeeding, or if you have breathing or lung problems, bleeding problems, heart or blood vessel disease, or nerve or muscle problems (such as myasthenia gravis). Tell your doctor if you have ever had face surgery or if you have a urinary tract infection or trouble urinating, diabetes, or multiple sclerosis. · This medicine may cause the following problems: ¨ Muscle weakness, loss of bladder control, trouble swallowing, speaking, or breathing (caused by the toxin spreading to other parts of your body) · This medicine may make your muscles weak or cause vision problems.  Do not drive or do anything else that could be dangerous until you know how this medicine affects you. · There are some warnings that only apply if you are receiving this medicine to treat the following: ¨ Injections near the eye: This medicine may reduce blinking, which can raise the risk of eye problems such as corneal exposure and ulcers. Tell your doctor right away if you notice that you are blinking less than usual or your eyes feel dry. ¨ Urinary incontinence: This medicine may cause autonomic dysreflexia, which can be a life-threatening condition. ¨ Overactive bladder: Check with your doctor right away if you have trouble urinating or a burning sensation while urinating. · This medicine contains products from donated human blood, so it may contain viruses, although the risk is low. Human donors and blood are always tested for viruses to keep the risk low. Talk with your doctor about this risk if you are concerned. · Your doctor will check your progress and the effects of this medicine at regular visits. Keep all appointments. Possible Side Effects While Using This Medicine:  
Call your doctor right away if you notice any of these side effects: · Allergic reaction: Itching or hives, swelling in your face or hands, swelling or tingling in your mouth or throat, chest tightness, trouble breathing · Blurred or double vision, droopy eyelids · Change in how much or how often you urinate, trouble urinating, or painful urination · Chest pain, slow or uneven heartbeat · Headache, increased sweating, warmth or redness in your face, neck, or arm · Muscle weakness · Trouble swallowing, talking, or breathing If you notice these less serious side effects, talk with your doctor: · Fever, chills, cough, stuffy or runny nose, sore throat, and body aches · Pain in your neck, back, arms, or legs · Redness, pain, tenderness, bruising, swelling, or weakness where the shot was given If you notice other side effects that you think are caused by this medicine, tell your doctor. Call your doctor for medical advice about side effects. You may report side effects to FDA at 5-388-FDA-0116 © 2017 [facility] Information is for End User's use only and may not be sold, redistributed or otherwise used for commercial purposes. The above information is an  only. It is not intended as medical advice for individual conditions or treatments. Talk to your doctor, nurse or pharmacist before following any medical regimen to see if it is safe and effective for you. PRESCRIPTION REFILL POLICY [facility] Statement to Patients April 1, 2014 In an effort to ensure the large volume of patient prescription refills is processed in the most efficient and expeditious manner, we are asking our patients to assist us by calling your Pharmacy for all prescription refills, this will include also your  Mail Order Pharmacy. The pharmacy will contact our office electronically to continue the refill process. Please do not wait until the last minute to call your pharmacy. We need at least 48 hours (2days) to fill prescriptions. We also encourage you to call your pharmacy before going to  your prescription to make sure it is ready. With regard to controlled substance prescription refill requests (narcotic refills) that need to be picked up at our office, we ask your cooperation by providing us with at least 72 hours (3days) notice that you will need a refill. We will not refill narcotic prescription refill requests after 4:00pm on any weekday, Monday through Thursday, or after 2:00pm on Fridays, or on the weekends. We encourage everyone to explore another way of getting your prescription refill request processed using [employer], our patient web portal through our electronic medical record system.  [employer] is an efficient and effective way to communicate your medication request directly to the office and downloadable as an dori on your smart phone . [employer] also features a review functionality that allows you to view your medication list as well as leave messages for your physician. Are you ready to get connected? If so please review the attatched instructions or speak to any of our staff to get you set up right away! Thank you so much for your cooperation. Should you have any questions please contact our Practice Administrator. The Physicians and Staff,  [facility] Introducing [facility] [place] & [facility]! Dear Valdemar Stephens: 
Thank you for requesting a Greenpie account. Our records indicate that you already have an active Greenpie account. You can access your account anytime at [email or web address] Did you know that you can access your hospital and ER discharge instructions at any time in Greenpie? You can also review all of your test results from your hospital stay or ER visit. Additional Information If you have questions, please visit the Frequently Asked Questions section of the Greenpie website at [email or web address] Remember, Greenpie is NOT to be used for urgent needs. For medical emergencies, dial 911. Now available from your iPhone and Android! Please provide this summary of care documentation to your next provider. Your primary care clinician is listed as [address]. If you have any questions after today's visit, please call [phone].

## 2017-10-13 NOTE — PROCEDURES
Patient comes for Chemodenervation for chronic migraine headaches. Since last seen there has been an at least 60% reduction in the severity of migraines over her previous baseline which exceeded more than 15 migraine days a month. There has been no weakness, fatigue or pain at the site of the injections or generally. Prior to chemodenervation migraines were refractory to treatment with abortive medications including Imitrex, NSAIDs and barbiturates. Similiarly they were refractory to antidepressants, antiepileptics and betablockers which were used as preventive therapy. Each treatment was tried at least two months or stopped because of an adverse reaction. After consenting the patient and discussing the procedure and possible side effects. The chemodenervant was reconstituted as follows:  200 units of botox was mixed with 4ml of perservative free saline and withdrawn, using a into four sterile 1ml BD syringes. Sterile 30 gauge half inch needles were affixed to the syringes. Procedure   Chemodenervant was injected into the following muscles which were identified using their anatomical land marks. Each site was aspirated prior to injection to ensure that there was no vascular compromise. Muscle Units/inj No. Of injections   Trapezius BL 5 U 3 each   Cervical Paraspinals BL 5 U 3 each   Occipitalis 5 U 3 each   Temporalis BL 5 U 3 each    BL 5 U 1 each   Procerus 5 U 1   Frontalis BL 5 U 5   Masseter BL 5 U  2 each   Total units 180 20 units waste     Nominal bleeding at injection sites. Patient tolerated the procedure well. No reported pain following the procedure. Poppy Pandey

## 2017-10-13 NOTE — PATIENT INSTRUCTIONS
OnabotulinumtoxinA (By injection)   OnabotulinumtoxinA (ql-n-ean-fa-ACG-chb-tox-in-ay)  Treats muscle stiffness, muscle spasms, excessive sweating, overactive bladder, or loss of bladder control. Prevents chronic migraine headaches. Improves the appearance of wrinkles on the face. Brand Name(s): Botox, Botox Cosmetic   There may be other brand names for this medicine. When This Medicine Should Not Be Used: This medicine is not right for everyone. You should not receive this medicine if you had an allergic reaction to onabotulinumtoxinA or any other botulinum toxin product. How to Use This Medicine:   Injectable  · Your doctor will prescribe your exact dose and tell you how often it should be given. This medicine is given by a healthcare provider as a shot under your skin or into a muscle. · You may be given medicine to numb the area where the shot will be injected. If you receive the medicine around your eyes, you may be given eye drops or ointment to numb the area. After your injection, you may need to wear a protective contact lens or eye patch. · If you are being treated for excessive sweating, shave your underarms but do not use deodorant for 24 hours before your injection. Avoid exercise, hot foods or liquids, or anything else that could make you sweat for 30 minutes before your injection. · The recommended treatment schedule for chronic migraine is every 12 weeks. · This medicine works slowly. Once your condition has improved, the medicine will last about 3 months, then the effects will slowly go away. You might need more injections to treat your condition. ¨ Muscle spasms in the eyelids should improve within 3 to 10 days. ¨ Eye muscle problems should improve 1 or 2 days after the injection, and the improvement should last for 2 to 6 weeks. ¨ Neck pain should improve within 2 to 6 weeks. ¨ Arm stiffness should improve within 4 to 6 weeks.   ¨ Facial lines or wrinkles should improve 1 or 2 days.  · This medicine should come with a Medication Guide. Ask your pharmacist for a copy if you do not have one. · Missed dose:Call your doctor or pharmacist for instructions. Drugs and Foods to Avoid:   Ask your doctor or pharmacist before using any other medicine, including over-the-counter medicines, vitamins, and herbal products. · Some foods and medicine can affect how onabotulinumtoxinA works. Tell your doctor if you are using any of the following:  ¨ Aspirin or a blood thinner (such as ticlopidine, warfarin)  ¨ Muscle relaxer  ¨ Medicine for an infection (such as amikacin, gentamicin, streptomycin, tobramycin)  · Tell your doctor if you have received an injection of any botulinum toxin product within the past 4 months. Warnings While Using This Medicine:   · Tell your doctor if you are pregnant or breastfeeding, or if you have breathing or lung problems, bleeding problems, heart or blood vessel disease, or nerve or muscle problems (such as myasthenia gravis). Tell your doctor if you have ever had face surgery or if you have a urinary tract infection or trouble urinating, diabetes, or multiple sclerosis. · This medicine may cause the following problems:  ¨ Muscle weakness, loss of bladder control, trouble swallowing, speaking, or breathing (caused by the toxin spreading to other parts of your body)  · This medicine may make your muscles weak or cause vision problems. Do not drive or do anything else that could be dangerous until you know how this medicine affects you. · There are some warnings that only apply if you are receiving this medicine to treat the following:   ¨ Injections near the eye: This medicine may reduce blinking, which can raise the risk of eye problems such as corneal exposure and ulcers. Tell your doctor right away if you notice that you are blinking less than usual or your eyes feel dry. ¨ Urinary incontinence:  This medicine may cause autonomic dysreflexia, which can be a life-threatening condition. ¨ Overactive bladder: Check with your doctor right away if you have trouble urinating or a burning sensation while urinating. · This medicine contains products from donated human blood, so it may contain viruses, although the risk is low. Human donors and blood are always tested for viruses to keep the risk low. Talk with your doctor about this risk if you are concerned. · Your doctor will check your progress and the effects of this medicine at regular visits. Keep all appointments. Possible Side Effects While Using This Medicine:   Call your doctor right away if you notice any of these side effects:  · Allergic reaction: Itching or hives, swelling in your face or hands, swelling or tingling in your mouth or throat, chest tightness, trouble breathing  · Blurred or double vision, droopy eyelids  · Change in how much or how often you urinate, trouble urinating, or painful urination  · Chest pain, slow or uneven heartbeat  · Headache, increased sweating, warmth or redness in your face, neck, or arm  · Muscle weakness  · Trouble swallowing, talking, or breathing  If you notice these less serious side effects, talk with your doctor:   · Fever, chills, cough, stuffy or runny nose, sore throat, and body aches  · Pain in your neck, back, arms, or legs  · Redness, pain, tenderness, bruising, swelling, or weakness where the shot was given  If you notice other side effects that you think are caused by this medicine, tell your doctor. Call your doctor for medical advice about side effects. You may report side effects to FDA at 9-208-FDA-4657  © 2017 [address] [employer] Information is for End User's use only and may not be sold, redistributed or otherwise used for commercial purposes. The above information is an  only. It is not intended as medical advice for individual conditions or treatments.  Talk to your doctor, nurse or pharmacist before following any medical regimen to see if it is safe and effective for you. [facility]   Statement to Patients  April 1, 2014      In an effort to ensure the large volume of patient prescription refills is processed in the most efficient and expeditious manner, we are asking our patients to assist us by calling your Pharmacy for all prescription refills, this will include also your  Mail Order Pharmacy. The pharmacy will contact our office electronically to continue the refill process. Please do not wait until the last minute to call your pharmacy. We need at least 48 hours (2days) to fill prescriptions. We also encourage you to call your pharmacy before going to  your prescription to make sure it is ready. With regard to controlled substance prescription refill requests (narcotic refills) that need to be picked up at our office, we ask your cooperation by providing us with at least 72 hours (3days) notice that you will need a refill. We will not refill narcotic prescription refill requests after 4:00pm on any weekday, Monday through Thursday, or after 2:00pm on Fridays, or on the weekends. We encourage everyone to explore another way of getting your prescription refill request processed using [employer], our patient web portal through our electronic medical record system. [employer] is an efficient and effective way to communicate your medication request directly to the office and  downloadable as an dori on your smart phone . [employer] also features a review functionality that allows you to view your medication list as well as leave messages for your physician. Are you ready to get connected? If so please review the attatched instructions or speak to any of our staff to get you set up right away! Thank you so much for your cooperation. Should you have any questions please contact our Practice Administrator.     The Physicians and Staff,  [facility]

## 2017-12-15 DIAGNOSIS — G43.719 INTRACTABLE CHRONIC MIGRAINE WITHOUT AURA AND WITHOUT STATUS MIGRAINOSUS: ICD-10-CM

## 2017-12-18 NOTE — TELEPHONE ENCOUNTER
From: Laura Hobbs  To: Maria Isabel Leblanc MD  Sent: 12/15/2017 4:02 PM EST  Subject: Medication Renewal Request    Original authorizing provider: MD Kim Pink. Kelly Yeh would like a refill of the following medications:  butalbital-acetaminophen (PHRENILIN)  mg tablet [Nadya Simmons MD]  methylPREDNISolone (MEDROL DOSEPACK) 4 mg tablet Maria Isabel Leblanc MD]    Preferred pharmacy: [facility]/PH[facility]ACY #[id number]- [id number], [address] [place] [address]:  I'm making this request a little early to avoid conflicting with the holidays and so I don't run out. Wishing you a very happy holiday season!  Thank you, Apple Borges

## 2017-12-18 NOTE — TELEPHONE ENCOUNTER
Future Appointments  Date Time Provider Leeann Anderson   1/12/2018 12:00 PM Stefany Pradhan MD [address]   2/8/2018 1:30 PM Dilcia Smalls MD [facility]                         Last Appointment My Department:  10/13/2017    Would you like to refill below? .    Requested Prescriptions     Pending Prescriptions Disp Refills    butalbital-acetaminophen (PHRENILIN)  mg tablet 90 Tab 2     Sig: Take 1 Tab by mouth every eight (8) hours.     methylPREDNISolone (MEDROL DOSEPACK) 4 mg tablet 1 Dose Pack 0     Sig: Take as directed

## 2017-12-20 RX ORDER — METHYLPREDNISOLONE 4 MG/1
TABLET ORAL
Qty: 1 DOSE PACK | Refills: 0 | Status: SHIPPED | OUTPATIENT
Start: 2017-12-20

## 2017-12-20 RX ORDER — BUTALBITAL AND ACETAMINOPHEN 325; 50 MG/1; MG/1
1 TABLET ORAL EVERY 8 HOURS
Qty: 90 TAB | Refills: 2 | Status: SHIPPED | OUTPATIENT
Start: 2017-12-20

## 2017-12-27 ENCOUNTER — TELEPHONE (OUTPATIENT)
Dept: NEUROLOGY | Age: 52
End: 2017-12-27

## 2017-12-27 NOTE — TELEPHONE ENCOUNTER
[employer] and spoke with Rusty Palmer; she advised to me that the Botox was shipped to [address] [place] [address] [address], [facility] which is the old office for ; advised to Rusty Palmer that that is no longer his office and hasn't been for sometime now. I also asked how that was even shipped out with the confirmation of shipment and permission to ship. Rusty Palmer advised to me that it was delivered on 12/26/17 and signed for by Alicia Hardin; It has been shipped back however it cannot be used and they are going to send over a new botox. Rusty Palmer stated that it was their error that this happened and apologized for the mix up. I verified the address for Dr. Tony Thompson and she stated that they will be calling within the next few days to update everything to get the shipment out for the patient in time for the botox injection.

## 2017-12-29 NOTE — TELEPHONE ENCOUNTER
[place]; Advised to [place] of the events that took place on a phone call with Luann Darling on 12/27 and advised that I was checking on the order. She stated to me that she didn't see anything in the notes about Botox being shipped back out to the office, nor does she see anything that Luann Darling spoke with a pharmacist. Was placed on hold so [place] could get the ball rolling on the getting the Botox shipped out for the patient.  [place] request a replacement shipment and the Botox will be received on 01/03/18

## 2017-12-29 NOTE — TELEPHONE ENCOUNTER
Spoke with Tariq Nolasco to confirm shipment of the Botox the shipment has been confirmed and the Botox will be delivered on 01/03/18

## 2018-01-03 ENCOUNTER — DOCUMENTATION ONLY (OUTPATIENT)
Dept: NEUROLOGY | Age: 53
End: 2018-01-03

## 2019-05-16 ENCOUNTER — TELEPHONE (OUTPATIENT)
Dept: NEUROLOGY | Age: 54
End: 2019-05-16